# Patient Record
Sex: FEMALE | Race: WHITE | NOT HISPANIC OR LATINO | Employment: FULL TIME | URBAN - METROPOLITAN AREA
[De-identification: names, ages, dates, MRNs, and addresses within clinical notes are randomized per-mention and may not be internally consistent; named-entity substitution may affect disease eponyms.]

---

## 2021-09-18 ENCOUNTER — APPOINTMENT (EMERGENCY)
Dept: RADIOLOGY | Facility: HOSPITAL | Age: 56
End: 2021-09-18
Payer: COMMERCIAL

## 2021-09-18 ENCOUNTER — HOSPITAL ENCOUNTER (OUTPATIENT)
Facility: HOSPITAL | Age: 56
Setting detail: OBSERVATION
Discharge: HOME | End: 2021-09-19
Attending: EMERGENCY MEDICINE | Admitting: INTERNAL MEDICINE
Payer: COMMERCIAL

## 2021-09-18 DIAGNOSIS — R00.2 PALPITATIONS: Primary | ICD-10-CM

## 2021-09-18 DIAGNOSIS — R42 DIZZINESS: ICD-10-CM

## 2021-09-18 DIAGNOSIS — R94.31 ABNORMAL EKG: ICD-10-CM

## 2021-09-18 DIAGNOSIS — R00.2 PALPITATION: ICD-10-CM

## 2021-09-18 LAB
ALBUMIN SERPL BCP-MCNC: 4.8 G/DL (ref 3.4–4.8)
ALP SERPL-CCNC: 52.7 U/L (ref 35–140)
ALT SERPL W P-5'-P-CCNC: 16 U/L (ref 5–54)
ANION GAP SERPL CALCULATED.3IONS-SCNC: 12 MMOL/L (ref 4–13)
AST SERPL W P-5'-P-CCNC: 23 U/L (ref 15–41)
BASOPHILS # BLD AUTO: 0.02 THOUSANDS/ΜL (ref 0–0.1)
BASOPHILS NFR BLD AUTO: 0 % (ref 0–1)
BILIRUB SERPL-MCNC: 0.34 MG/DL (ref 0.3–1.2)
BILIRUB UR QL STRIP: NEGATIVE
BUN SERPL-MCNC: 19 MG/DL (ref 6–20)
CALCIUM SERPL-MCNC: 9.8 MG/DL (ref 8.4–10.2)
CHLORIDE SERPL-SCNC: 101 MMOL/L (ref 96–108)
CLARITY UR: CLEAR
CO2 SERPL-SCNC: 24 MMOL/L (ref 22–33)
COLOR UR: YELLOW
CREAT SERPL-MCNC: 0.76 MG/DL (ref 0.4–1.1)
D DIMER PPP FEU-MCNC: <0.19 MG/L FEU (ref 0.19–0.49)
EOSINOPHIL # BLD AUTO: 0 THOUSAND/ΜL (ref 0–0.61)
EOSINOPHIL NFR BLD AUTO: 0 % (ref 0–6)
ERYTHROCYTE [DISTWIDTH] IN BLOOD BY AUTOMATED COUNT: 12.3 % (ref 11.6–15.1)
GFR SERPL CREATININE-BSD FRML MDRD: 87 ML/MIN/1.73SQ M
GLUCOSE SERPL-MCNC: 130 MG/DL (ref 65–140)
GLUCOSE UR STRIP-MCNC: NEGATIVE MG/DL
HCT VFR BLD AUTO: 41 % (ref 34.8–46.1)
HGB BLD-MCNC: 13.7 G/DL (ref 11.5–15.4)
HGB UR QL STRIP.AUTO: NEGATIVE
IMM GRANULOCYTES # BLD AUTO: 0 THOUSAND/UL (ref 0–0.2)
IMM GRANULOCYTES NFR BLD AUTO: 0 % (ref 0–2)
KETONES UR STRIP-MCNC: NEGATIVE MG/DL
LEUKOCYTE ESTERASE UR QL STRIP: NEGATIVE
LYMPHOCYTES # BLD AUTO: 0.77 THOUSANDS/ΜL (ref 0.6–4.47)
LYMPHOCYTES NFR BLD AUTO: 12 % (ref 14–44)
MAGNESIUM SERPL-MCNC: 1.9 MG/DL (ref 1.6–2.6)
MCH RBC QN AUTO: 29.8 PG (ref 26.8–34.3)
MCHC RBC AUTO-ENTMCNC: 33.4 G/DL (ref 31.4–37.4)
MCV RBC AUTO: 89 FL (ref 82–98)
MONOCYTES # BLD AUTO: 0.29 THOUSAND/ΜL (ref 0.17–1.22)
MONOCYTES NFR BLD AUTO: 5 % (ref 4–12)
NEUTROPHILS # BLD AUTO: 5.12 THOUSANDS/ΜL (ref 1.85–7.62)
NEUTS SEG NFR BLD AUTO: 83 % (ref 43–75)
NITRITE UR QL STRIP: NEGATIVE
PH UR STRIP.AUTO: 6 [PH]
PLATELET # BLD AUTO: 229 THOUSANDS/UL (ref 149–390)
PMV BLD AUTO: 12.3 FL (ref 8.9–12.7)
POTASSIUM SERPL-SCNC: 3.7 MMOL/L (ref 3.5–5)
PROT SERPL-MCNC: 7.4 G/DL (ref 6.4–8.3)
PROT UR STRIP-MCNC: NEGATIVE MG/DL
RBC # BLD AUTO: 4.6 MILLION/UL (ref 3.81–5.12)
SODIUM SERPL-SCNC: 137 MMOL/L (ref 133–145)
SP GR UR STRIP.AUTO: <=1.005 (ref 1–1.03)
TROPONIN I SERPL-MCNC: <0.03 NG/ML (ref 0–0.07)
TSH SERPL DL<=0.05 MIU/L-ACNC: 1.05 UIU/ML (ref 0.34–5.6)
UROBILINOGEN UR QL STRIP.AUTO: 0.2 E.U./DL
WBC # BLD AUTO: 6.2 THOUSAND/UL (ref 4.31–10.16)

## 2021-09-18 PROCEDURE — 84484 ASSAY OF TROPONIN QUANT: CPT | Performed by: INTERNAL MEDICINE

## 2021-09-18 PROCEDURE — 71045 X-RAY EXAM CHEST 1 VIEW: CPT

## 2021-09-18 PROCEDURE — 96361 HYDRATE IV INFUSION ADD-ON: CPT

## 2021-09-18 PROCEDURE — 81003 URINALYSIS AUTO W/O SCOPE: CPT | Performed by: EMERGENCY MEDICINE

## 2021-09-18 PROCEDURE — 99285 EMERGENCY DEPT VISIT HI MDM: CPT | Performed by: EMERGENCY MEDICINE

## 2021-09-18 PROCEDURE — 85025 COMPLETE CBC W/AUTO DIFF WBC: CPT | Performed by: EMERGENCY MEDICINE

## 2021-09-18 PROCEDURE — 99220 PR INITIAL OBSERVATION CARE/DAY 70 MINUTES: CPT | Performed by: INTERNAL MEDICINE

## 2021-09-18 PROCEDURE — 93005 ELECTROCARDIOGRAM TRACING: CPT

## 2021-09-18 PROCEDURE — 84443 ASSAY THYROID STIM HORMONE: CPT | Performed by: EMERGENCY MEDICINE

## 2021-09-18 PROCEDURE — 85379 FIBRIN DEGRADATION QUANT: CPT | Performed by: EMERGENCY MEDICINE

## 2021-09-18 PROCEDURE — 84484 ASSAY OF TROPONIN QUANT: CPT | Performed by: EMERGENCY MEDICINE

## 2021-09-18 PROCEDURE — 80053 COMPREHEN METABOLIC PANEL: CPT | Performed by: EMERGENCY MEDICINE

## 2021-09-18 PROCEDURE — 99285 EMERGENCY DEPT VISIT HI MDM: CPT

## 2021-09-18 PROCEDURE — 96374 THER/PROPH/DIAG INJ IV PUSH: CPT

## 2021-09-18 PROCEDURE — 36415 COLL VENOUS BLD VENIPUNCTURE: CPT | Performed by: EMERGENCY MEDICINE

## 2021-09-18 PROCEDURE — 83735 ASSAY OF MAGNESIUM: CPT | Performed by: EMERGENCY MEDICINE

## 2021-09-18 RX ORDER — ALUMINUM HYDROXIDE, MAGNESIUM HYDROXIDE, DIMETHICONE 400; 400; 40 MG/5ML; MG/5ML; MG/5ML
1 LIQUID ORAL
COMMUNITY

## 2021-09-18 RX ORDER — LORAZEPAM 2 MG/ML
0.5 INJECTION INTRAMUSCULAR ONCE
Status: COMPLETED | OUTPATIENT
Start: 2021-09-18 | End: 2021-09-18

## 2021-09-18 RX ORDER — SODIUM CHLORIDE 9 MG/ML
75 INJECTION, SOLUTION INTRAVENOUS CONTINUOUS
Status: DISCONTINUED | OUTPATIENT
Start: 2021-09-18 | End: 2021-09-19 | Stop reason: HOSPADM

## 2021-09-18 RX ORDER — ASPIRIN 325 MG
325 TABLET ORAL ONCE
Status: COMPLETED | OUTPATIENT
Start: 2021-09-18 | End: 2021-09-18

## 2021-09-18 RX ORDER — DIPHENOXYLATE HYDROCHLORIDE AND ATROPINE SULFATE 2.5; .025 MG/1; MG/1
1 TABLET ORAL
COMMUNITY

## 2021-09-18 RX ADMIN — SODIUM CHLORIDE 500 ML: 0.9 INJECTION, SOLUTION INTRAVENOUS at 13:35

## 2021-09-18 RX ADMIN — LORAZEPAM 0.5 MG: 2 INJECTION, SOLUTION INTRAMUSCULAR; INTRAVENOUS at 14:44

## 2021-09-18 RX ADMIN — ASPIRIN 325 MG ORAL TABLET 325 MG: 325 PILL ORAL at 14:44

## 2021-09-18 RX ADMIN — SODIUM CHLORIDE 75 ML/HR: 0.9 INJECTION, SOLUTION INTRAVENOUS at 20:44

## 2021-09-18 NOTE — ASSESSMENT & PLAN NOTE
Patient reports episodic palpitation  She reports this is new  She endorses associated SOB on ambulation  Reports family history of CAD in Father  12 lead EKG showed sinus tachycardia      - Will monitor on telemetry  - follow troponin  - repeat 12 lead EKG every 3 hours with troponin

## 2021-09-18 NOTE — ED PROVIDER NOTES
History  Chief Complaint   Patient presents with    Dizziness     Pt presents to ED from home w/ palpatations starting 0930 w/  while setting up for her daughters baby shower  Pt /66 at home, "which is high for me"  Pt (+) hx mitrovalve prolapse  To er with dizziness and palpations noted this AM  She states she did not sleep well last night as she was setting up for a baby shower this AM  She was working outside and was in the heat setting up  She started to not feel well so came inside and sat down where she noted a fat HR, felt generally weak and mildly light headed  She denies any cp, sob at that time  No syncope or near syncope, rather more weak and mildly light headed  She states she walks about 4 miles a dya, no changes in her ability to do so or sob  / cp during these walks  No known heart diseases  No dvt /pe hx  No legs pain or swelling  No fever or recent illness  She questions if she is anxious from excite of the baby shower and further states she did not sleep well as she was anxious about setting up  Prior to Admission Medications   Prescriptions Last Dose Informant Patient Reported? Taking? Glucosamine-Chondroit-Vit C-Mn (GLUCOSAMINE 1500 COMPLEX PO)   Yes No   Sig: glucosamin-chond-msm-padmini-115HC   Lactobacillus Rhamnosus, GG, (RA Probiotic Digestive Care) CAPS   Yes No   Sig: Take 1 capsule by mouth   VITAMIN D, ERGOCALCIFEROL, PO   Yes No   Sig: Take 1,000 Units by mouth   cyanocobalamin (VITAMIN B-12) 1000 MCG tablet   Yes No   Sig: Take 2,500 mcg by mouth   multivitamin (THERAGRAN) TABS   Yes No   Sig: Take 1 tablet by mouth      Facility-Administered Medications: None       History reviewed  No pertinent past medical history  Past Surgical History:   Procedure Laterality Date    URETHRAL DILATION      WISDOM TOOTH EXTRACTION         History reviewed  No pertinent family history  I have reviewed and agree with the history as documented      E-Cigarette/Vaping  E-Cigarette Use Never User      E-Cigarette/Vaping Substances     Social History     Tobacco Use    Smoking status: Former Smoker    Smokeless tobacco: Never Used   Vaping Use    Vaping Use: Never used   Substance Use Topics    Alcohol use: Yes    Drug use: Not Currently       Review of Systems   All other systems reviewed and are negative  Physical Exam  Physical Exam  Constitutional:       General: She is not in acute distress  Appearance: She is well-developed  She is not ill-appearing, toxic-appearing or diaphoretic  HENT:      Head: Normocephalic and atraumatic  Right Ear: Tympanic membrane and external ear normal       Left Ear: Tympanic membrane and external ear normal       Nose: Nose normal       Mouth/Throat:      Mouth: Mucous membranes are moist       Pharynx: Oropharynx is clear  No oropharyngeal exudate  Eyes:      General:         Right eye: No discharge  Left eye: No discharge  Pupils: Pupils are equal, round, and reactive to light  Neck:      Vascular: No JVD  Cardiovascular:      Rate and Rhythm: Normal rate and regular rhythm  Pulses: Normal pulses  Heart sounds: Normal heart sounds  No murmur heard  No friction rub  No gallop  Pulmonary:      Effort: Pulmonary effort is normal  No respiratory distress  Breath sounds: Normal breath sounds  No stridor  No wheezing, rhonchi or rales  Chest:      Chest wall: No tenderness  Abdominal:      General: Abdomen is flat  Bowel sounds are normal  There is no distension  Palpations: Abdomen is soft  There is no mass  Tenderness: There is no abdominal tenderness  There is no guarding or rebound  Hernia: No hernia is present  Musculoskeletal:         General: No swelling, tenderness, deformity or signs of injury  Normal range of motion  Cervical back: Normal range of motion and neck supple  Right lower leg: No edema  Left lower leg: No edema     Skin:     General: Skin is warm  Capillary Refill: Capillary refill takes less than 2 seconds  Coloration: Skin is not jaundiced or pale  Findings: No bruising, erythema, lesion or rash  Neurological:      General: No focal deficit present  Mental Status: She is alert and oriented to person, place, and time  Mental status is at baseline  Cranial Nerves: No cranial nerve deficit  Sensory: No sensory deficit  Motor: No weakness or abnormal muscle tone        Coordination: Coordination normal       Gait: Gait normal       Deep Tendon Reflexes: Reflexes normal    Psychiatric:         Mood and Affect: Mood normal          Vital Signs  ED Triage Vitals   Temperature Pulse Respirations Blood Pressure SpO2   09/18/21 1243 09/18/21 1243 09/18/21 1243 09/18/21 1243 09/18/21 1243   97 9 °F (36 6 °C) 103 16 157/75 100 %      Temp Source Heart Rate Source Patient Position - Orthostatic VS BP Location FiO2 (%)   09/18/21 1243 09/18/21 1441 09/18/21 1441 09/18/21 1441 --   Oral Monitor Lying Right arm       Pain Score       09/18/21 1441       No Pain           Vitals:    09/19/21 0400 09/19/21 0404 09/19/21 0408 09/19/21 0706   BP: 110/70 124/71 129/82 111/67   Pulse: 76 74 84 78   Patient Position - Orthostatic VS: Lying - Orthostatic VS Sitting - Orthostatic VS Standing - Orthostatic VS Lying         Visual Acuity      ED Medications  Medications   sodium chloride 0 9 % bolus 500 mL (0 mL Intravenous Stopped 9/18/21 1627)   LORazepam (ATIVAN) injection 0 5 mg (0 5 mg Intravenous Given 9/18/21 1444)   aspirin tablet 325 mg (325 mg Oral Given 9/18/21 1444)       Diagnostic Studies  Results Reviewed     Procedure Component Value Units Date/Time    Basic metabolic panel [[id number]] Collected: 09/19/21 0512    Lab Status: Final result Specimen: Blood from Arm, Right Updated: 09/19/21 0736     Sodium 142 mmol/L      Potassium 3 9 mmol/L      Chloride 108 mmol/L      CO2 29 mmol/L      ANION GAP 5 mmol/L      BUN 14 mg/dL      Creatinine 0 74 mg/dL      Glucose 85 mg/dL      Glucose, Fasting 85 mg/dL      Calcium 9 0 mg/dL      eGFR 90 ml/min/1 73sq m     Narrative:      [place] guidelines for Chronic Kidney Disease (CKD):     Stage 1 with normal or high GFR (GFR > 90 mL/min/1 73 square meters)    Stage 2 Mild CKD (GFR = 60-89 mL/min/1 73 square meters)    Stage 3A Moderate CKD (GFR = 45-59 mL/min/1 73 square meters)    Stage 3B Moderate CKD (GFR = 30-44 mL/min/1 73 square meters)    Stage 4 Severe CKD (GFR = 15-29 mL/min/1 73 square meters)    Stage 5 End Stage CKD (GFR <15 mL/min/1 73 square meters)  Note: GFR calculation is accurate only with a steady state creatinine    CBC and differential [[id number]]  (Normal) Collected: 09/19/21 0512    Lab Status: Final result Specimen: Blood from Arm, Right Updated: 09/19/21 0702     WBC 5 23 Thousand/uL      RBC 4 18 Million/uL      Hemoglobin 12 6 g/dL      Hematocrit 38 3 %      MCV 92 fL      MCH 30 1 pg      MCHC 32 9 g/dL      RDW 12 7 %      MPV 12 5 fL      Platelets 735 Thousands/uL      Neutrophils Relative 56 %      Immat GRANS % 0 %      Lymphocytes Relative 34 %      Monocytes Relative 8 %      Eosinophils Relative 1 %      Basophils Relative 1 %      Neutrophils Absolute 2 92 Thousands/µL      Immature Grans Absolute 0 01 Thousand/uL      Lymphocytes Absolute 1 80 Thousands/µL      Monocytes Absolute 0 40 Thousand/µL      Eosinophils Absolute 0 07 Thousand/µL      Basophils Absolute 0 03 Thousands/µL     Troponin I [[id number]]  (Normal) Collected: 09/18/21 1850    Lab Status: Final result Specimen: Blood from Arm, Right Updated: 09/18/21 1931     Troponin I <0 03 ng/mL     TSH [[id number]]  (Normal) Collected: 09/18/21 1337    Lab Status: Final result Specimen: Blood from Arm, Left Updated: 09/18/21 1420     TSH 3RD GENERATON 1 046 uIU/mL     Narrative:      Patients undergoing fluorescein dye angiography may retain small amounts of fluorescein in the body for 48-72 hours post procedure  Samples containing fluorescein can produce falsely depressed TSH values  If the patient had this procedure,a specimen should be resubmitted post fluorescein clearance        Troponin I [[id number]]  (Normal) Collected: 09/18/21 1337    Lab Status: Final result Specimen: Blood from Arm, Left Updated: 09/18/21 1407     Troponin I <0 03 ng/mL     Comprehensive metabolic panel [[id number]] Collected: 09/18/21 1337    Lab Status: Final result Specimen: Blood from Arm, Left Updated: 09/18/21 1405     Sodium 137 mmol/L      Potassium 3 7 mmol/L      Chloride 101 mmol/L      CO2 24 mmol/L      ANION GAP 12 mmol/L      BUN 19 mg/dL      Creatinine 0 76 mg/dL      Glucose 130 mg/dL      Calcium 9 8 mg/dL      AST 23 U/L      ALT 16 U/L      Alkaline Phosphatase 52 7 U/L      Total Protein 7 4 g/dL      Albumin 4 8 g/dL      Total Bilirubin 0 34 mg/dL      eGFR 87 ml/min/1 73sq m     Narrative:      [place] guidelines for Chronic Kidney Disease (CKD):     Stage 1 with normal or high GFR (GFR > 90 mL/min/1 73 square meters)    Stage 2 Mild CKD (GFR = 60-89 mL/min/1 73 square meters)    Stage 3A Moderate CKD (GFR = 45-59 mL/min/1 73 square meters)    Stage 3B Moderate CKD (GFR = 30-44 mL/min/1 73 square meters)    Stage 4 Severe CKD (GFR = 15-29 mL/min/1 73 square meters)    Stage 5 End Stage CKD (GFR <15 mL/min/1 73 square meters)  Note: GFR calculation is accurate only with a steady state creatinine    Magnesium [[id number]]  (Normal) Collected: 09/18/21 1337    Lab Status: Final result Specimen: Blood from Arm, Left Updated: 09/18/21 1405     Magnesium 1 9 mg/dL     D-Dimer [[id number]]  (Abnormal) Collected: 09/18/21 1337    Lab Status: Final result Specimen: Blood from Arm, Left Updated: 09/18/21 1401     D-Dimer, Quant  <0 19 mg/L FEU     UA w Reflex to Microscopic w Reflex to Culture [[id number]]  (Normal) Collected: 09/18/21 1340    Lab Status: Final result Specimen: Urine, Clean Catch Updated: 09/18/21 1347     Color, UA Yellow     Clarity, UA Clear     Specific Gravity, UA <=1 005     pH, UA 6 0     Leukocytes, UA Negative     Nitrite, UA Negative     Protein, UA Negative mg/dl      Glucose, UA Negative mg/dl      Ketones, UA Negative mg/dl      Urobilinogen, UA 0 2 E U /dl      Bilirubin, UA Negative     Blood, UA Negative    CBC and differential [[id number]]  (Abnormal) Collected: 09/18/21 1337    Lab Status: Final result Specimen: Blood from Arm, Left Updated: 09/18/21 1345     WBC 6 20 Thousand/uL      RBC 4 60 Million/uL      Hemoglobin 13 7 g/dL      Hematocrit 41 0 %      MCV 89 fL      MCH 29 8 pg      MCHC 33 4 g/dL      RDW 12 3 %      MPV 12 3 fL      Platelets 535 Thousands/uL      Neutrophils Relative 83 %      Immat GRANS % 0 %      Lymphocytes Relative 12 %      Monocytes Relative 5 %      Eosinophils Relative 0 %      Basophils Relative 0 %      Neutrophils Absolute 5 12 Thousands/µL      Immature Grans Absolute 0 00 Thousand/uL      Lymphocytes Absolute 0 77 Thousands/µL      Monocytes Absolute 0 29 Thousand/µL      Eosinophils Absolute 0 00 Thousand/µL      Basophils Absolute 0 02 Thousands/µL                  XR chest 1 view portable   Final Result by Jennifer Sharma MD (09/19 1355)      No acute cardiopulmonary disease                    Workstation performed: [id number]                    Procedures  Procedures         ED Course             HEART Risk Score      Most Recent Value   Heart Score Risk Calculator   History  1 Filed at: 09/18/2021 1349   ECG  2 Filed at: 09/18/2021 1349   Age  1 Filed at: 09/18/2021 1349   Risk Factors  --   Troponin  0 Filed at: 09/18/2021 1349   HEART Score  --                        MARCUS Risk Score      Most Recent Value   Age >= 65  0 Filed at: 09/18/2021 1616   Known CAD (stenosis >= 50%)  0 Filed at: 09/18/2021 1616   Recent (<=24 hrs) Service Angina  0 Filed at: 09/18/2021 1616   ST Deviation >= 0 5 mm  1 Filed at: 09/18/2021 1616   3+ CAD Risk Factors (FHx, HTN, HLP, DM, Smoker)  1 Filed at: 09/18/2021 1616   Aspirin Use Past 7 Days  0 Filed at: 09/18/2021 1616   Elevated Cardiac Markers  0 Filed at: 09/18/2021 1616   MARCUS Risk Score (Calculated)  2 Filed at: 09/18/2021 1616                  [facility]  Number of Diagnoses or Management Options  Diagnosis management comments: ekg- sinus tachy at 103, STD laterally, v3-v6, normal axis and intervals  No old to compare  To er with palpation noted when setting up a baby shower, she feels weak and tired and mildly light headed  She states she is unsure if this is anxiety driven as she was setting up for baby shower and she further states she did not sleep well last night  Her ekg is concerning  She has no cp, sob, denies changes in her 4 mile walk ability which she does daily  Will do cardiac work up and further evaluate her sx  Sop walking to the bathroom she reported she felt different than her norm, felt increased palpations and although not sob she states her breathing is not as easy as normal  Lungs clear  She remains a bit tachty in er  Heart score 4  No cp  Discussed case with cards dr Ema Macdonald who feels ok to keep at [facility] at this time, should her trop turn positive consider tranfers  discussed case with dr Angelic Cedillo has accepted         Disposition  Final diagnoses:   Palpitations   Abnormal EKG     Time reflects when diagnosis was documented in both MDM as applicable and the Disposition within this note     Time User Action Codes Description Comment    9/18/2021  3:34 PM Iron Florence Add [R00 2] Palpitations     9/18/2021  3:34 PM Dao Beni Add [R94 31] Abnormal EKG     9/19/2021  8:33 AM Payton Harps Add [R00 2] Palpitation     9/19/2021  8:33 AM Payton Harps Add [R42] Dizziness       ED Disposition     ED Disposition Condition Date/Time Comment    Admit Stable Sat Sep 18, 2021  3:34 PM Case was discussed with [place] and [place] and the patient's admission status was agreed to be Admission Status: observation status to the service of Dr Carballo   Follow-up Information    None         Discharge Medication List as of 9/19/2021 11:41 AM      START taking these medications    Details   aspirin (ECOTRIN LOW STRENGTH) 81 mg EC tablet Take 1 tablet (81 mg total) by mouth daily, Starting Sun 9/19/2021, Print         CONTINUE these medications which have NOT CHANGED    Details   cyanocobalamin (VITAMIN B-12) 1000 MCG tablet Take 2,500 mcg by mouth, Historical Med      Glucosamine-Chondroit-Vit C-Mn (GLUCOSAMINE 1500 COMPLEX PO) glucosamin-chond-msm-padmini-115HC, Historical Med      Lactobacillus Rhamnosus, GG, (RA Probiotic Digestive Care) CAPS Take 1 capsule by mouth, Historical Med      multivitamin (THERAGRAN) TABS Take 1 tablet by mouth, Historical Med      VITAMIN D, ERGOCALCIFEROL, PO Take 1,000 Units by mouth, Historical Med           Outpatient Discharge Orders   NM Myocardial Perfusion Spect (Pharmacological Induced Stress and/or Rest)   Standing Status: Future Standing Exp  Date: 09/19/25     Echo complete with contrast if indicated   Standing Status: Future Standing Exp   Date: 09/19/25       PDMP Review     None          ED Provider  Electronically Signed by           Alexis Wade MD  09/21/21 8727

## 2021-09-18 NOTE — ASSESSMENT & PLAN NOTE
EKG on presentation showed sinus tachycardia, normal axis, with ST depression in V4 to V6  Troponin x1 negative  No EKG for comparision   She reports palpitation and mild SOB on exertion  Positive family history of CAD in father  Cardiology consulted regarding finding, - feels patient can be monitored in this facility      · Will obtain delta troponin    · Monitor on telemetry  · Repeat 12- lead EKG with delta trops

## 2021-09-18 NOTE — ASSESSMENT & PLAN NOTE
Reports dizziness  Denies vertigo, tinnitus, gait instability, dysphagia or chest pain  On my encounter, denies progressive dizziness  On exam, negative HINTS test  Heart sound 1 and 2 heard  No murmurs rubs or gallops  Dizziness most likely secondary to dehydration vs fatigue  EKG done in the ER showed ST depression in V4, V5 and V6    Troponin x1 negative    - Will admit for observation  - will give gentle hydration -  IV NS 75 cc/hour   - orthostatic vital signs  - will follow troponin x2   - obtain 12 lead EKG if symptomatic

## 2021-09-18 NOTE — H&P
Ja U  65   H&P- Ignacio Hill 1965, 61 y o  female MRN: [id number]  Unit/Bed#: [facility] Encounter: [id number]  Primary Care Provider: No primary care provider on file  Date and time admitted to hospital: 9/18/2021 12:40 PM    Abnormal EKG  Assessment & Plan  EKG on presentation showed sinus tachycardia, normal axis, with ST depression in V4 to V6  Troponin x1 negative  No EKG for comparision   She reports palpitation and mild SOB on exertion  Positive family history of CAD in father  Cardiology consulted regarding finding, - feels patient can be monitored in this facility  · Will obtain delta troponin    · Monitor on telemetry  · Repeat 12- lead EKG with delta trops    Palpitation  Assessment & Plan  Patient reports episodic palpitation  She reports this is new  She endorses associated SOB on ambulation  Reports family history of CAD in Father  12 lead EKG showed sinus tachycardia  - Will monitor on telemetry  - follow troponin  - repeat 12 lead EKG every 3 hours with troponin    Dizziness  Assessment & Plan  Reports dizziness  Denies vertigo, tinnitus, gait instability, dysphagia or chest pain  On my encounter, denies progressive dizziness  On exam, negative HINTS test  Heart sound 1 and 2 heard  No murmurs rubs or gallops  Dizziness most likely secondary to dehydration vs fatigue  EKG done in the ER showed ST depression in V4, V5 and V6    Troponin x1 negative    - Will admit for observation  - will give gentle hydration -  IV NS 75 cc/hour   - orthostatic vital signs  - will follow troponin x2   - obtain 12 lead EKG if symptomatic    VTE Prophylaxis: Enoxaparin (Lovenox)  / sequential compression device   Code Status:  Full code  POLST: There is no POLST form on file for this patient (pre-hospital)  Discussion with family:  Discussed with patient    Anticipated Length of Stay:  Patient will be admitted on an Observation basis with an anticipated length of stay of  < 2 midnights  Justification for Hospital Stay:  Dizziness/ palpitation with EKG changes    Total Time for Visit, including Counseling / Coordination of Care: 45 minutes  Greater than 50% of this total time spent on direct patient counseling and coordination of care  Chief Complaint:  Dizziness and palpitation    History of Present Illness:    Migdalia Strong is a 61 y o  female with no significant past medical history who presents with dizziness and palpitation this morning about 4 hours prior to presentation  Patient has been working in the heat of the sun prepping her daughter's baby shower when she noticed dizziness necessitating her to go indoors  Apparently, patient has been awake since 0200 preparing for her daughter-in-law baby shower  BP at the time was in the 150/70s, which for patient appears to be high she runs in the 100s/60s  She reports associated palpitation  She denies chest pain, and  profuse sweating  Does not remember having this kind of symptomatology in the past   She endorses mild lightheadedness  Reports family history of CAD in father  She is physically active as she walks 4 miles daily  She has a regular coffee drinker  In the ER, noted to [facility] vitally stable  Chest x-ray was unremarkable  CBC, CMP, UA , D-dimer, troponin x1 unremarkable  EKG showed ST depression in V4, V5 and V6  ER physician contacted on-call cardiologist who feels patient is stable to remain in [address] [facility]:    Review of Systems   Constitutional: Negative for activity change and appetite change  Respiratory: Negative for cough and chest tightness  Cardiovascular: Positive for palpitations  Negative for chest pain  Gastrointestinal: Negative for abdominal distention, diarrhea and vomiting  Genitourinary: Negative for difficulty urinating, dysuria and flank pain  Musculoskeletal: Negative for arthralgias, gait problem and myalgias  Neurological: Positive for dizziness  Negative for seizures and numbness  Past Medical and Surgical History:     History reviewed  No pertinent past medical history  History reviewed  No pertinent surgical history  Meds/Allergies:    Prior to Admission medications    Not on File     I have reviewed home medications with patient personally  Allergies: No Known Allergies    Social History:     Marital Status: /Civil Union   Occupation:  Retired  Patient Pre-hospital Living Situation:  Lives with daughter  Patient Pre-hospital Level of Mobility:  Self ambulating  Patient Pre-hospital Diet Restrictions:  None  Substance Use History:   Social History     Substance and Sexual Activity   Alcohol Use Yes     Social History     Tobacco Use   Smoking Status Former Smoker   Smokeless Tobacco Never Used     Social History     Substance and Sexual Activity   Drug Use Not Currently       Family History:    non-contributory    Physical Exam:     Vitals:   Blood Pressure: 126/71 (09/18/21 1635)  Pulse: 96 (09/18/21 1635)  Temperature: 97 9 °F (36 6 °C) (09/18/21 1243)  Temp Source: Oral (09/18/21 1243)  Respirations: 14 (09/18/21 1635)  Height: 5' 6" (167 6 cm) (09/18/21 1243)  Weight - Scale: 63 kg (138 lb 14 2 oz) (09/18/21 1243)  SpO2: 100 % (09/18/21 1635)    Physical Exam  Constitutional:       Appearance: She is well-developed  HENT:      Head: Normocephalic and atraumatic  Nose: Nose normal    Eyes:      Pupils: Pupils are equal, round, and reactive to light  Neck:      Vascular: No JVD  Cardiovascular:      Rate and Rhythm: Normal rate and regular rhythm  Heart sounds: No murmur heard  Pulmonary:      Effort: No respiratory distress  Breath sounds: Normal breath sounds  No wheezing or rales  Chest:      Chest wall: No tenderness  Abdominal:      General: There is no distension  Tenderness: There is no abdominal tenderness  There is no guarding or rebound     Musculoskeletal:         General: Normal range of motion  Cervical back: Neck supple  Skin:     General: Skin is warm and dry  Capillary Refill: Capillary refill takes less than 2 seconds  Neurological:      Mental Status: She is alert and oriented to person, place, and time  Cranial Nerves: No cranial nerve deficit  Sensory: No sensory deficit  Coordination: Coordination normal       Deep Tendon Reflexes: Reflexes normal    Psychiatric:         Behavior: Behavior normal          Additional Data:     Lab Results: I have personally reviewed pertinent reports  Results from last 7 days   Lab Units 09/18/21  1337   WBC Thousand/uL 6 20   HEMOGLOBIN g/dL 13 7   HEMATOCRIT % 41 0   PLATELETS Thousands/uL 229   NEUTROS PCT % 83*   LYMPHS PCT % 12*   MONOS PCT % 5   EOS PCT % 0     Results from last 7 days   Lab Units 09/18/21  1337   SODIUM mmol/L 137   POTASSIUM mmol/L 3 7   CHLORIDE mmol/L 101   CO2 mmol/L 24   BUN mg/dL 19   CREATININE mg/dL 0 76   ANION GAP mmol/L 12   CALCIUM mg/dL 9 8   ALBUMIN g/dL 4 8   TOTAL BILIRUBIN mg/dL 0 34   ALK PHOS U/L 52 7   ALT U/L 16   AST U/L 23   GLUCOSE RANDOM mg/dL 130                       Imaging: I have personally reviewed pertinent reports  and I have personally reviewed pertinent films in PACS    XR chest 1 view portable    (Results Pending)       EKG, Pathology, and Other Studies Reviewed on Admission:   · EKG: Sinus tachy/ ST depression in V4, V5, and V6    Allscripts / Epic Records Reviewed: Yes     ** Please Note: This note has been constructed using a voice recognition system   **

## 2021-09-19 VITALS
TEMPERATURE: 98.8 F | HEIGHT: 66 IN | BODY MASS INDEX: 22.32 KG/M2 | WEIGHT: 138.89 LBS | HEART RATE: 78 BPM | SYSTOLIC BLOOD PRESSURE: 111 MMHG | DIASTOLIC BLOOD PRESSURE: 67 MMHG | RESPIRATION RATE: 19 BRPM | OXYGEN SATURATION: 98 %

## 2021-09-19 LAB
ANION GAP SERPL CALCULATED.3IONS-SCNC: 5 MMOL/L (ref 4–13)
ATRIAL RATE: 103 BPM
BASOPHILS # BLD AUTO: 0.03 THOUSANDS/ΜL (ref 0–0.1)
BASOPHILS NFR BLD AUTO: 1 % (ref 0–1)
BUN SERPL-MCNC: 14 MG/DL (ref 6–20)
CALCIUM SERPL-MCNC: 9 MG/DL (ref 8.4–10.2)
CHLORIDE SERPL-SCNC: 108 MMOL/L (ref 96–108)
CHOLEST SERPL-MCNC: 165 MG/DL
CO2 SERPL-SCNC: 29 MMOL/L (ref 22–33)
CREAT SERPL-MCNC: 0.74 MG/DL (ref 0.4–1.1)
EOSINOPHIL # BLD AUTO: 0.07 THOUSAND/ΜL (ref 0–0.61)
EOSINOPHIL NFR BLD AUTO: 1 % (ref 0–6)
ERYTHROCYTE [DISTWIDTH] IN BLOOD BY AUTOMATED COUNT: 12.7 % (ref 11.6–15.1)
GFR SERPL CREATININE-BSD FRML MDRD: 90 ML/MIN/1.73SQ M
GLUCOSE P FAST SERPL-MCNC: 85 MG/DL (ref 70–105)
GLUCOSE SERPL-MCNC: 85 MG/DL (ref 65–140)
HCT VFR BLD AUTO: 38.3 % (ref 34.8–46.1)
HCV AB SER QL: NORMAL
HDLC SERPL-MCNC: 51 MG/DL
HGB BLD-MCNC: 12.6 G/DL (ref 11.5–15.4)
IMM GRANULOCYTES # BLD AUTO: 0.01 THOUSAND/UL (ref 0–0.2)
IMM GRANULOCYTES NFR BLD AUTO: 0 % (ref 0–2)
LDLC SERPL CALC-MCNC: 101 MG/DL (ref 0–100)
LYMPHOCYTES # BLD AUTO: 1.8 THOUSANDS/ΜL (ref 0.6–4.47)
LYMPHOCYTES NFR BLD AUTO: 34 % (ref 14–44)
MCH RBC QN AUTO: 30.1 PG (ref 26.8–34.3)
MCHC RBC AUTO-ENTMCNC: 32.9 G/DL (ref 31.4–37.4)
MCV RBC AUTO: 92 FL (ref 82–98)
MONOCYTES # BLD AUTO: 0.4 THOUSAND/ΜL (ref 0.17–1.22)
MONOCYTES NFR BLD AUTO: 8 % (ref 4–12)
NEUTROPHILS # BLD AUTO: 2.92 THOUSANDS/ΜL (ref 1.85–7.62)
NEUTS SEG NFR BLD AUTO: 56 % (ref 43–75)
NONHDLC SERPL-MCNC: 114 MG/DL
P AXIS: 72 DEGREES
PLATELET # BLD AUTO: 199 THOUSANDS/UL (ref 149–390)
PMV BLD AUTO: 12.5 FL (ref 8.9–12.7)
POTASSIUM SERPL-SCNC: 3.9 MMOL/L (ref 3.5–5)
PR INTERVAL: 159 MS
QRS AXIS: 65 DEGREES
QRSD INTERVAL: 93 MS
QT INTERVAL: 354 MS
QTC INTERVAL: 464 MS
RBC # BLD AUTO: 4.18 MILLION/UL (ref 3.81–5.12)
SODIUM SERPL-SCNC: 142 MMOL/L (ref 133–145)
T WAVE AXIS: 29 DEGREES
TRIGL SERPL-MCNC: 64.3 MG/DL
VENTRICULAR RATE: 103 BPM
WBC # BLD AUTO: 5.23 THOUSAND/UL (ref 4.31–10.16)

## 2021-09-19 PROCEDURE — 85025 COMPLETE CBC W/AUTO DIFF WBC: CPT | Performed by: INTERNAL MEDICINE

## 2021-09-19 PROCEDURE — 99217 PR OBSERVATION CARE DISCHARGE MANAGEMENT: CPT | Performed by: INTERNAL MEDICINE

## 2021-09-19 PROCEDURE — 86803 HEPATITIS C AB TEST: CPT | Performed by: NURSE PRACTITIONER

## 2021-09-19 PROCEDURE — 80061 LIPID PANEL: CPT | Performed by: INTERNAL MEDICINE

## 2021-09-19 PROCEDURE — 93010 ELECTROCARDIOGRAM REPORT: CPT | Performed by: INTERNAL MEDICINE

## 2021-09-19 PROCEDURE — 80048 BASIC METABOLIC PNL TOTAL CA: CPT | Performed by: INTERNAL MEDICINE

## 2021-09-19 RX ORDER — ASPIRIN 81 MG/1
81 TABLET ORAL DAILY
Status: DISCONTINUED | OUTPATIENT
Start: 2021-09-19 | End: 2021-09-19 | Stop reason: HOSPADM

## 2021-09-19 RX ORDER — ASPIRIN 81 MG/1
81 TABLET ORAL DAILY
Qty: 30 TABLET | Refills: 0 | Status: SHIPPED | OUTPATIENT
Start: 2021-09-19

## 2021-09-19 RX ADMIN — ASPIRIN 81 MG: 81 TABLET, COATED ORAL at 09:58

## 2021-09-19 NOTE — NURSING NOTE
AVS reviewed with pt  Script for aspirin, echo and stress test given to pt  Pt verbalizes understanding of instructions  IV removed  Pt  has no further questions at this time

## 2021-09-19 NOTE — ASSESSMENT & PLAN NOTE
EKG on presentation showed sinus tachycardia, normal axis, with ST depression in V4 to V6  Troponin x1 negative  No EKG for comparision   She reports palpitation and mild SOB on exertion  Positive family history of CAD in father  Cardiology consulted regarding finding, - feels patient can be monitored in this facility      · Will obtain delta troponin    · Monitor on telemetry  · Repeat 12- lead EKG with trop

## 2021-09-19 NOTE — DISCHARGE SUMMARY
Ja U  65   Discharge- Brayan Wilson 1965, 61 y o  female MRN: [id number]  Unit/Bed#: -01 Encounter: [id number]  Primary Care Provider: No primary care provider on file  Date and time admitted to hospital: 9/18/2021 12:40 PM    * Abnormal EKG  Assessment & Plan  EKG on presentation showed sinus tachycardia, normal axis, with ST depression in V4 to V6  Troponin x1 negative  No EKG for comparision   She reports palpitation and mild SOB on exertion  Positive family history of CAD in father  Cardiology consulted regarding finding, - feels patient can be monitored in this facility  · Will obtain delta troponin    · Monitor on telemetry  · Repeat 12- lead EKG with trop    Palpitation  Assessment & Plan  Patient reports episodic palpitation  She reports this is new  She endorses associated SOB on ambulation  Reports family history of CAD in Father  12 lead EKG showed sinus tachycardia  - Will monitor on telemetry  - follow troponin  - repeat 12 lead EKG every 3 hours with troponin    Dizziness  Assessment & Plan  Reports dizziness  Denies vertigo, tinnitus, gait instability, dysphagia or chest pain  On my encounter, denies progressive dizziness  On exam, negative HINTS test  Heart sound 1 and 2 heard  No murmurs rubs or gallops  Dizziness most likely secondary to dehydration vs fatigue  EKG done in the ER showed ST depression in V4, V5 and V6    Troponin x1 negative    - Will admit for observation  - will give gentle hydration -  IV NS 75 cc/hour   - orthostatic vital signs  - will follow troponin x2   - obtain 12 lead EKG if symptomatic              Medical Problems     Resolved Problems  Date Reviewed: 9/19/2021    None                Admission Date:   Admission Orders (From admission, onward)     Ordered        09/18/21 1535  Place in Observation  Once                     Admitting Diagnosis: Palpitations [R00 2]  Dizziness [R42]  Abnormal EKG [R94 31]    HPI: Logan Royals Alireza Goodwin is a 61 y o  female with no significant past medical history who presents with dizziness and palpitation this morning about 4 hours prior to presentation  Patient has been working in the heat of the sun prepping her daughter's baby shower when she noticed dizziness necessitating her to go indoors  Apparently, patient has been awake since 0200 preparing for her daughter-in-law baby shower  BP at the time was in the 150/70s, which for patient appears to be high she runs in the 100s/60s  She reports associated palpitation  She denies chest pain, and  profuse sweating  Does not remember having this kind of symptomatology in the past   She endorses mild lightheadedness  Reports family history of CAD in father  She is physically active as she walks 4 miles daily  She has a regular coffee drinker       In the ER, noted to [facility] vitally stable  Chest x-ray was unremarkable  CBC, CMP, UA , D-dimer, troponin x1 unremarkable  EKG showed ST depression in V4, V5 and V6  ER physician contacted on-call cardiologist who feels patient is stable to remain in [facility]    Procedures Performed: No orders of the defined types were placed in this encounter  Summary of Hospital Course:     Patient was admitted episodic palpitation and shortness on exertion  Patient has no underlying medical history no history of CAD the past   Patient was visiting her family for a baby shower this morning  Patient reported dizziness but no vertigo or imbalance  Patient had EKG done in the ED which showed mild ST depression in V4 to V6  Troponins were trended which was negative  Patient was kept overnight observation and given IV fluid hydration as patient looked dehydrated  Initially she was tachycardic and heart rate improved and now in sinus rhythm  Repeat EKGs were done which showed mild improvement in his depression  Patient has no further dizziness or palpitations  Patient is feeling better  Vitals are stable  Patient has been advised to follow-up with PCP and Cardiology as outpatient as patient is from [facility]  Patient is planned to travel back to [place] today and plan was discussed in length with the patient and the  at bedside and recommended an echocardiogram and nuclear stress test in this coming week  Patient has not had any cardiac workup in the past but however has a good exercise capacity and walks 5-6 miles every day  But however cannot rule out underlying cardiac etiology  HEENT-PERRLA, moist oral mucosa  Neck-supple, no JVD elevation   Respiratory-equal air entry bilaterally, no rales or rhonchi  Cardiovascular system-S1, S2 heard, no murmur or gallops or rubs  Abdomen-soft, nontender, no guarding or rigidity, bowel sounds heard  Extremities-no pedal edema  Peripheral pulses palpable  Musculoskeletal-no contractures  Central nervous system-no acute focal neurological deficit ,no sensory or motor deficit noted  Skin-no rash noted      Significant Findings, Care, Treatment and Services Provided: ekg ,troponin    Complications: nil    Condition at Discharge: good         Discharge instructions/Information to patient and family:   See after visit summary for information provided to patient and family  Provisions for Follow-Up Care:  See after visit summary for information related to follow-up care and any pertinent home health orders  PCP: No primary care provider on file  Disposition: Home    Planned Readmission: No    Discharge Statement   I spent 35 minutes discharging the patient  This time was spent on the day of discharge  I had direct contact with the patient on the day of discharge  Additional documentation is required if more than 30 minutes were spent on discharge  Discharge Medications:  See after visit summary for reconciled discharge medications provided to patient and family

## 2023-10-13 NOTE — UTILIZATION REVIEW
Initial Clinical Review    Admission: Date/Time/Statement:   Admission Orders (From admission, onward)     Ordered        09/18/21 1535  Place in Observation  Once                   Orders Placed This Encounter   Procedures    Place in Observation     Standing Status:   Standing     Number of Occurrences:   1     Order Specific Question:   Level of Care     Answer:   Med Surg [16]     Order Specific Question:   Bed request comments     Answer:   tele     ED Arrival Information     Expected Arrival Acuity    - 9/18/2021 12:29 Urgent         Means of arrival Escorted by Service Admission type    Walk-In Family Member Hospitalist Urgent         Arrival complaint    Palpitations; Rapid Heart Rate        Chief Complaint   Patient presents with    Dizziness     Pt presents to ED from home w/ palpatations starting 0930 w/  while setting up for her daughters baby shower  Pt /66 at home, "which is high for me"  Pt (+) hx mitrovalve prolapse  Initial Presentation: 60 yo fem w/no pmhx to ED from home admitted under obs due to dizziness and palpitations w/abnormal EKG  Presented after being awake since 2am prepping for a baby shower, then [facility] to get ready  Felt dizzy, went inside with high bp discovered, having palpitations and profuse sweating with mild lightheadedness  Usually walks 4 miles daily  Exam unremarkable  EKG st depressions, CXR wnl  Cardiac w/u and tele in progress, IVF started       Date: 9/19    ED Triage Vitals   Temperature Pulse Respirations Blood Pressure SpO2   09/18/21 1243 09/18/21 1243 09/18/21 1243 09/18/21 1243 09/18/21 1243   97 9 °F (36 6 °C) 103 16 157/75 100 %      Temp Source Heart Rate Source Patient Position - Orthostatic VS BP Location FiO2 (%)   09/18/21 1243 09/18/21 1441 09/18/21 1441 09/18/21 1441 --   Oral Monitor Lying Right arm       Pain Score       09/18/21 1441       No Pain          Wt Readings from Last 1 Encounters:   09/18/21 63 kg (138 lb 14 2 oz)     Additional Vital Signs:   Date/Time  Temp  Pulse  Resp  BP  MAP (mmHg)  SpO2  O2 Device  Patient Position - Orthostatic VS   09/19/21 0706  98 8 °F (37 1 °C)  78  19  111/67  --  98 %  --  Lying   09/19/21 0408  --  84  18  129/82  84  --  --  Standing - Orthostatic VS   09/19/21 0404  --  74  18  124/71  95  --  --  Sitting - Orthostatic VS   09/19/21 0400  --  76  18  110/70  85  99 %  None (Room air)  Lying - Orthostatic VS   09/19/21 0000  97 9 °F (36 6 °C)  82  17  141/65  100  98 %  None (Room air)  Lying   09/18/21 1954  98 °F (36 7 °C)  74  18  136/76  102  97 %  None (Room air)  Lying   09/18/21 1635  --  96  14  126/71  --  100 %  None (Room air)  Lying   09/18/21 1441  --  102  18  126/69  --  100 %  None (Room air)  Lying       Pertinent Labs/Diagnostic Test Results:   9/18 EKG sinus tachycardia, normal axis, with ST depression in V4 to V6    XR chest 1 view portable    (Results Pending)           9/19 echo    9/19 nuc stress        Results from last 7 days   Lab Units 09/19/21  0512 09/18/21  1337   WBC Thousand/uL 5 23 6 20   HEMOGLOBIN g/dL 12 6 13 7   HEMATOCRIT % 38 3 41 0   PLATELETS Thousands/uL 199 229   NEUTROS ABS Thousands/µL 2 92 5 12         Results from last 7 days   Lab Units 09/19/21  0512 09/18/21  1337   SODIUM mmol/L 142 137   POTASSIUM mmol/L 3 9 3 7   CHLORIDE mmol/L 108 101   CO2 mmol/L 29 24   ANION GAP mmol/L 5 12   BUN mg/dL 14 19   CREATININE mg/dL 0 74 0 76   EGFR ml/min/1 73sq m 90 87   CALCIUM mg/dL 9 0 9 8   MAGNESIUM mg/dL  --  1 9     Results from last 7 days   Lab Units 09/18/21  1337   AST U/L 23   ALT U/L 16   ALK PHOS U/L 52 7   TOTAL PROTEIN g/dL 7 4   ALBUMIN g/dL 4 8   TOTAL BILIRUBIN mg/dL 0 34         Results from last 7 days   Lab Units 09/19/21  0512 09/18/21  1337   GLUCOSE RANDOM mg/dL 85 130             No results found for: BETA-HYDROXYBUTYRATE                   Results from last 7 days   Lab Units 09/18/21  8292 09/18/21  0195 09/18/21  1337   TROPONIN I ng/mL <0 03 <0 03 <0 03     Results from last 7 days   Lab Units 09/18/21  1337   D-DIMER QUANTITATIVE mg/L FEU <0 19*         Results from last 7 days   Lab Units 09/18/21  1337   TSH 3RD GENERATON uIU/mL 1 046                                             Results from last 7 days   Lab Units 09/18/21  1340   CLARITY UA  Clear   COLOR UA  Yellow   SPEC GRAV UA  <=1 005   PH UA  6 0   GLUCOSE UA mg/dl Negative   KETONES UA mg/dl Negative   BLOOD UA  Negative   PROTEIN UA mg/dl Negative   NITRITE UA  Negative   BILIRUBIN UA  Negative   UROBILINOGEN UA E U /dl 0 2   LEUKOCYTES UA  Negative                                               ED Treatment:   Medication Administration from 09/18/2021 1228 to 09/18/2021 1648       Date/Time Order Dose Route Action Action by Comments     09/18/2021 1627 sodium chloride 0 9 % bolus 500 mL 0 mL Intravenous Stopped Gilford Pax, MARIANA      09/18/2021 1335 sodium chloride 0 9 % bolus 500 mL 500 mL Intravenous [place] [address] Jacquelynn Runner, MARIANA      09/18/2021 1444 LORazepam (ATIVAN) injection 0 5 mg 0 5 mg Intravenous Given Kena Cardozo RN      09/18/2021 1444 aspirin tablet 325 mg 325 mg Oral Given Kena Cardozo RN         History reviewed  No pertinent past medical history  Present on Admission:   Dizziness   Palpitation   Abnormal EKG      Admitting Diagnosis: Palpitations [R00 2]  Dizziness [R42]  Abnormal EKG [R94 31]  Age/Sex: 61 y o  female  Admission Orders:  Scheduled Medications:  aspirin, 81 mg, Oral, Daily  enoxaparin, 40 mg, Subcutaneous, Daily      Continuous IV Infusions:  sodium chloride, 75 mL/hr, Intravenous, Continuous      PRN Meds:       None    Network Utilization Review Department  ATTENTION: Please call with any questions or concerns to [phone] and carefully listen to the prompts so that you are directed to the right person   All voicemails are confidential   Baldo Valladares all requests for admission clinical reviews, approved or denied determinations and any other requests to dedicated fax number below belonging to the campus where the patient is receiving treatment   List of dedicated fax numbers for the Facilities:  [address] [place] [address] DENIALS (Administrative/Medical Necessity) [phone]   [address] (Maternity/NICU/Pediatrics) [phone]   [phone] [id number] [place] [address] Gregory Michelle Palacios [address] [facility] [place] [address] [phone] Provider (A): Dr. Corley

## 2025-02-05 NOTE — PLAN OF CARE
Continue Seroquel, Hydroxyzine pamoate, Ativan, Risperdal, gabapentin as above    Problem: Nutrition/Hydration-ADULT  Goal: Nutrient/Hydration intake appropriate for improving, restoring or maintaining nutritional needs  Description: Monitor and assess patient's nutrition/hydration status for malnutrition  Collaborate with interdisciplinary team and initiate plan and interventions as ordered  Monitor patient's weight and dietary intake as ordered or per policy  Utilize nutrition screening tool and intervene as necessary  Determine patient's food preferences and provide high-protein, high-caloric foods as appropriate       INTERVENTIONS:  - Monitor oral intake, urinary output, labs, and treatment plans  - Assess nutrition and hydration status and recommend course of action  - Evaluate amount of meals eaten  - Assist patient with eating if necessary   - Allow adequate time for meals  - Recommend/ encourage appropriate diets, oral nutritional supplements, and vitamin/mineral supplements  - Order, calculate, and assess calorie counts as needed  - Recommend, monitor, and adjust tube feedings and TPN/PPN based on assessed needs  - Assess need for intravenous fluids  - Provide specific nutrition/hydration education as appropriate  - Include patient/family/caregiver in decisions related to nutrition  Outcome: Progressing